# Patient Record
(demographics unavailable — no encounter records)

---

## 2025-05-22 NOTE — HISTORY OF PRESENT ILLNESS
[de-identified] : 5/18/2025 [facility] Lumbar ct- report noted in chart.  No acute fracture or compression deformity. There is no prevertebral soft tissue swelling. There is no splaying of the spinous processes. There is no spondylolisthesis. Facet joint alignments are maintained.  Degenerative osteoarthritis and degenerative disc disease are present at L4-L5 and L5-S1. Findings include marginal osteophytes, uncovertebral spurring, loss of normal disc space height and endplate sclerosis, and facet joint space compartment narrowing with subchondral sclerosis and hypertrophic osteophytes at multiple levels. Canal contents are suboptimally evaluated inherent to CT technique. However, there are disc bulges at L4-L5 and L5-S1.  Ind. review- R paracentral HNP suspected L4/5 ===================================================================================================== 05/22/2025:  MICHAEL POOL is a 30 year y/o M here for evaluation of their lower right sided back pain. pt states the pain started over 10 years ago intermittently. notes this recent Saturday pain increased. pt complains of sharp pain when turning to much. taking Tylenol , cyclobenzaprine and ibuprofen as prescribed by er. pt went to er on Sunday due to sever back pain . denies injury, notes working out one morning and the pain starting after notes being crooked for 3 days. denies n/t  No bb dysfunction.   LISADA  no pmHX

## 2025-05-22 NOTE — IMAGING
[de-identified] : [facility]INE Inspection: No rash or ecchymosis Palpation: No tenderness to palpation or spasm in bilateral thoracic and lumbar paraspinal musculature, no SI joint tenderness to palpation ROM: Full with no pain Strength: 5/5 bilateral hip flexors, knee extensors, ankle dorsiflexors, EHL, ankle plantarflexors Sensation: Sensation present to light touch bilateral L2-S1 distributions Provocative maneuvers: + RLE straight leg raise [Straightening consistent with spasm] : Straightening consistent with spasm [Disc space narrowing] : Disc space narrowing

## 2025-05-22 NOTE — ASSESSMENT
[FreeTextEntry1] : R paracentral HNP suspected L4/5  PT, meds  Will discuss MRI   NSAIDs- Patient warned of risk of medication to GI tract, increased blood pressure, cardiac risk, and risk of fluid retention.  Advised to clear medication with internist or PCP if any concurrent health problem with heart, blood pressure, or GI system exists.  Gabapentin- Patient advised of sedating effects, instructed not to drive, operate machinery, or take with other sedating medications. Advised of need to taper on/off medication and risk of abruptly stopping gabapentin.

## 2025-07-08 NOTE — IMAGING
[Straightening consistent with spasm] : Straightening consistent with spasm [Disc space narrowing] : Disc space narrowing [de-identified] : LSPINE Inspection: No rash or ecchymosis Palpation: No tenderness to palpation or spasm in bilateral thoracic and lumbar paraspinal musculature, no SI joint tenderness to palpation ROM: Full with no pain Strength: 5/5 bilateral hip flexors, knee extensors, ankle dorsiflexors, EHL, ankle plantarflexors Sensation: Sensation present to light touch bilateral L2-S1 distributions Provocative maneuvers: + RLE straight leg raise

## 2025-07-08 NOTE — HISTORY OF PRESENT ILLNESS
[de-identified] : 5/18/2025 [facility] Lumbar ct- report noted in chart.  No acute fracture or compression deformity. There is no prevertebral soft tissue swelling. There is no splaying of the spinous processes. There is no spondylolisthesis. Facet joint alignments are maintained.  Degenerative osteoarthritis and degenerative disc disease are present at L4-L5 and L5-S1. Findings include marginal osteophytes, uncovertebral spurring, loss of normal disc space height and endplate sclerosis, and facet joint space compartment narrowing with subchondral sclerosis and hypertrophic osteophytes at multiple levels. Canal contents are suboptimally evaluated inherent to CT technique. However, there are disc bulges at L4-L5 and L5-S1.  Ind. review- R paracentral HNP suspected L4/5 ===================================================================================================== 05/22/2025:  MICHAEL POOL is a 30 year y/o M here for evaluation of their lower right sided back pain. pt states the pain started over 10 years ago intermittently. notes this recent Saturday pain increased. pt complains of sharp pain when turning to much. taking Tylenol , cyclobenzaprine and ibuprofen as prescribed by er. pt went to er on Sunday due to sever back pain . denies injury, notes working out one morning and the pain starting after notes being crooked for 3 days. denies n/t  No bb dysfunction.   NKDA  no pmHX   07/03/2025 here for follow up, reports persistent low back. He completed course PT/HEP with improvement. He continues to have increase in activity and modifications to prevent flare up. Taking medications sparingly.

## 2025-07-08 NOTE — IMAGING
[Straightening consistent with spasm] : Straightening consistent with spasm [Disc space narrowing] : Disc space narrowing [de-identified] : LSPINE Inspection: No rash or ecchymosis Palpation: No tenderness to palpation or spasm in bilateral thoracic and lumbar paraspinal musculature, no SI joint tenderness to palpation ROM: Full with no pain Strength: 5/5 bilateral hip flexors, knee extensors, ankle dorsiflexors, EHL, ankle plantarflexors Sensation: Sensation present to light touch bilateral L2-S1 distributions Provocative maneuvers: + RLE straight leg raise

## 2025-07-08 NOTE — ASSESSMENT
[FreeTextEntry1] : R paracentral HNP suspected L4/5  HEP regimen provided, meds  Will discuss MRI, deferred at this time given lessened pain  NSAIDs- Patient warned of risk of medication to GI tract, increased blood pressure, cardiac risk, and risk of fluid retention.  Advised to clear medication with internist or PCP if any concurrent health problem with heart, blood pressure, or GI system exists.

## 2025-07-08 NOTE — HISTORY OF PRESENT ILLNESS
[de-identified] : 5/18/2025 [facility] Lumbar ct- report noted in chart.  No acute fracture or compression deformity. There is no prevertebral soft tissue swelling. There is no splaying of the spinous processes. There is no spondylolisthesis. Facet joint alignments are maintained.  Degenerative osteoarthritis and degenerative disc disease are present at L4-L5 and L5-S1. Findings include marginal osteophytes, uncovertebral spurring, loss of normal disc space height and endplate sclerosis, and facet joint space compartment narrowing with subchondral sclerosis and hypertrophic osteophytes at multiple levels. Canal contents are suboptimally evaluated inherent to CT technique. However, there are disc bulges at L4-L5 and L5-S1.  Ind. review- R paracentral HNP suspected L4/5 ===================================================================================================== 05/22/2025:  MICHAEL POOL is a 30 year y/o M here for evaluation of their lower right sided back pain. pt states the pain started over 10 years ago intermittently. notes this recent Saturday pain increased. pt complains of sharp pain when turning to much. taking Tylenol , cyclobenzaprine and ibuprofen as prescribed by er. pt went to er on Sunday due to sever back pain . denies injury, notes working out one morning and the pain starting after notes being crooked for 3 days. denies n/t  No bb dysfunction.   NKDA  no pmHX   07/03/2025 here for follow up, reports persistent low back. He completed course PT/HEP with improvement. He continues to have increase in activity and modifications to prevent flare up. Taking medications sparingly.